# Patient Record
Sex: FEMALE | Race: AMERICAN INDIAN OR ALASKA NATIVE
[De-identification: names, ages, dates, MRNs, and addresses within clinical notes are randomized per-mention and may not be internally consistent; named-entity substitution may affect disease eponyms.]

---

## 2018-06-07 ENCOUNTER — HOSPITAL ENCOUNTER (EMERGENCY)
Dept: HOSPITAL 5 - ED | Age: 58
Discharge: HOME | End: 2018-06-07
Payer: COMMERCIAL

## 2018-06-07 VITALS — DIASTOLIC BLOOD PRESSURE: 88 MMHG | SYSTOLIC BLOOD PRESSURE: 172 MMHG

## 2018-06-07 DIAGNOSIS — Z88.8: ICD-10-CM

## 2018-06-07 DIAGNOSIS — I10: Primary | ICD-10-CM

## 2018-06-07 LAB
ALBUMIN SERPL-MCNC: 3.8 G/DL (ref 3.9–5)
ALT SERPL-CCNC: 10 UNITS/L (ref 7–56)
BILIRUB UR QL STRIP: (no result)
BLOOD UR QL VISUAL: (no result)
BUN SERPL-MCNC: 13 MG/DL (ref 7–17)
BUN/CREAT SERPL: 22 %
CALCIUM SERPL-MCNC: 9 MG/DL (ref 8.4–10.2)
HCT VFR BLD CALC: 40.6 % (ref 30.3–42.9)
HEMOLYSIS INDEX: 5
HGB BLD-MCNC: 13.4 GM/DL (ref 10.1–14.3)
MCH RBC QN AUTO: 26 PG (ref 28–32)
MCHC RBC AUTO-ENTMCNC: 33 % (ref 30–34)
MCV RBC AUTO: 79 FL (ref 79–97)
MUCOUS THREADS #/AREA URNS HPF: (no result) /HPF
PH UR STRIP: 7 [PH] (ref 5–7)
PLATELET # BLD: 284 K/MM3 (ref 140–440)
PROT UR STRIP-MCNC: (no result) MG/DL
RBC # BLD AUTO: 5.13 M/MM3 (ref 3.65–5.03)
RBC #/AREA URNS HPF: 2 /HPF (ref 0–6)
UROBILINOGEN UR-MCNC: 2 MG/DL (ref ?–2)
WBC #/AREA URNS HPF: < 1 /HPF (ref 0–6)

## 2018-06-07 PROCEDURE — 93005 ELECTROCARDIOGRAM TRACING: CPT

## 2018-06-07 PROCEDURE — 93010 ELECTROCARDIOGRAM REPORT: CPT

## 2018-06-07 PROCEDURE — 80053 COMPREHEN METABOLIC PANEL: CPT

## 2018-06-07 PROCEDURE — 36415 COLL VENOUS BLD VENIPUNCTURE: CPT

## 2018-06-07 PROCEDURE — 81001 URINALYSIS AUTO W/SCOPE: CPT

## 2018-06-07 PROCEDURE — 84703 CHORIONIC GONADOTROPIN ASSAY: CPT

## 2018-06-07 PROCEDURE — 85027 COMPLETE CBC AUTOMATED: CPT

## 2018-06-07 PROCEDURE — 70450 CT HEAD/BRAIN W/O DYE: CPT

## 2018-06-07 NOTE — CAT SCAN REPORT
CT HEAD WITHOUT CONTRAST:



HISTORY:  Headache, dizziness.



TECHNIQUE:  Sequential 2.5mm CT images.



COMPARISON: none.



FINDINGS:



Cerebral Parenchyma: Within normal limits.



Cerebellum:  Within normal limits.



Brainstem:  Within normal limits.



Ventricles: Normal.



Sella:  Normal.



Extra-axial spaces:  Normal.



Basal Cisterns:  Normal.



Intracranial Hemorrhage:  None.



Midline Shift:  None.



Calvarium: Normal.



Sinuses: Normal.



Mastoid Air Cells:  Normal.



Visualized Orbits:  Normal.







IMPRESSION:

Cranial CT scan within normal limits.

## 2018-06-07 NOTE — EMERGENCY DEPARTMENT REPORT
ED General Adult HPI





- General


Chief complaint: Headache


Stated complaint: HEADACHE


Time Seen by Provider: 06/07/18 09:44


Source: patient


Mode of arrival: Ambulatory


Limitations: No Limitations





- History of Present Illness


Initial comments: 





Is a 57-year-old female with no significant known past medical history who is 

complaining of generalized headache for the past week.  Patient states it waxes 

and wanes but at its worst is 10 out of 10 currently is 7 out of 10.  She 

states is pounding headache.  Patient denies any chest pain shortness of breath 

nausea vomiting diarrhea decreased vision and decreased ability to move her 

extremities.  Patient states she at times is somewhat dizzy and does have some 

blurred vision.





- Related Data


 Previous Rx's











 Medication  Instructions  Recorded  Last Taken  Type


 


Acetaminophen/Codeine [Tylenol #3] 1 tab PO Q6H PRN #10 tab 02/04/16 Unknown Rx


 


Benzonatate [Tessalon Perles] 100 mg PO Q8HR #15 capsule 02/04/16 Unknown Rx


 


guaiFENesin [Mucinex] 600 mg PO Q6HR #15 tablet.er 02/04/16 Unknown Rx


 


Amlodipine Besylate [Norvasc] 5 mg PO DAILY #30 tablet 06/07/18 Unknown Rx


 


Ibuprofen [Motrin] 600 mg PO Q8H PRN #15 tablet 06/07/18 Unknown Rx











 Allergies











Allergy/AdvReac Type Severity Reaction Status Date / Time


 


tramadol Allergy  Unknown Verified 06/07/18 07:39














ED Review of Systems


ROS: 


Stated complaint: HEADACHE


Other details as noted in HPI





Comment: All other systems reviewed and negative





ED Past Medical Hx





- Past Medical History


Previous Medical History?: No





- Surgical History


Past Surgical History?: No





- Social History


Smoking Status: Never Smoker


Substance Use Type: None





- Medications


Home Medications: 


 Home Medications











 Medication  Instructions  Recorded  Confirmed  Last Taken  Type


 


Acetaminophen/Codeine [Tylenol #3] 1 tab PO Q6H PRN #10 tab 02/04/16  Unknown Rx


 


Benzonatate [Tessalon Perles] 100 mg PO Q8HR #15 capsule 02/04/16  Unknown Rx


 


guaiFENesin [Mucinex] 600 mg PO Q6HR #15 tablet.er 02/04/16  Unknown Rx


 


Amlodipine Besylate [Norvasc] 5 mg PO DAILY #30 tablet 06/07/18  Unknown Rx


 


Ibuprofen [Motrin] 600 mg PO Q8H PRN #15 tablet 06/07/18  Unknown Rx














ED Physical Exam





- General


Limitations: No Limitations


General appearance: alert, in no apparent distress





- Head


Head exam: Present: atraumatic, normocephalic





- Eye


Eye exam: Present: normal appearance





- ENT


ENT exam: Present: mucous membranes moist





- Neck


Neck exam: Present: normal inspection





- Respiratory


Respiratory exam: Present: normal lung sounds bilaterally.  Absent: respiratory 

distress





- Cardiovascular


Cardiovascular Exam: Present: regular rate, normal rhythm.  Absent: systolic 

murmur, diastolic murmur, rubs, gallop





- GI/Abdominal


GI/Abdominal exam: Present: soft, normal bowel sounds





- Extremities Exam


Extremities exam: Present: normal inspection





- Back Exam


Back exam: Present: normal inspection





- Neurological Exam


Neurological exam: Present: alert, oriented X3





- Psychiatric


Psychiatric exam: Present: normal affect, normal mood





- Skin


Skin exam: Present: warm, dry, intact, normal color.  Absent: rash





ED Course





 Vital Signs











  06/07/18





  07:40


 


Temperature 98.8 F


 


Pulse Rate 78


 


Respiratory 18





Rate 


 


Blood Pressure 172/89


 


O2 Sat by Pulse 98





Oximetry 














ED Medical Decision Making





- Lab Data


Result diagrams: 


 06/07/18 07:59





 06/07/18 07:59





- EKG Data


-: EKG Interpreted by Me





- EKG Data


Interpretation: other (EKG shows sinus rhythm a rate of 74 normal axis 

intervals no ST segment elevation or depressions time of interpretation is 755)





- Medical Decision Making





Patient on review of her labs shows no acute abnormality there was elevated 

blood pressure which was treated here in the emergency department.  Patient 

will be referred to primary care was started on low-dose Norvasc and will be 

discharged home.


Critical care attestation.: 


If time is entered above; I have spent that time in minutes in the direct care 

of this critically ill patient, excluding procedure time.








ED Disposition


Clinical Impression: 


 Hypertensive urgency





Disposition: DC-01 TO HOME OR SELFCARE


Is pt being admited?: No


Does the pt Need Aspirin: No


Condition: Stable


Instructions:  Hypertension (ED)


Prescriptions: 


Amlodipine Besylate [Norvasc] 5 mg PO DAILY #30 tablet


Ibuprofen [Motrin] 600 mg PO Q8H PRN #15 tablet


 PRN Reason: Pain


Referrals: 


DANIEL BARRERA MD [Staff Physician] - 3-5 Days

## 2020-11-04 ENCOUNTER — HOSPITAL ENCOUNTER (EMERGENCY)
Dept: HOSPITAL 5 - ED | Age: 60
Discharge: HOME | End: 2020-11-04
Payer: SELF-PAY

## 2020-11-04 VITALS — SYSTOLIC BLOOD PRESSURE: 109 MMHG | DIASTOLIC BLOOD PRESSURE: 68 MMHG

## 2020-11-04 DIAGNOSIS — R07.9: ICD-10-CM

## 2020-11-04 DIAGNOSIS — Z88.8: ICD-10-CM

## 2020-11-04 DIAGNOSIS — Z79.899: ICD-10-CM

## 2020-11-04 DIAGNOSIS — I10: Primary | ICD-10-CM

## 2020-11-04 LAB
ALBUMIN SERPL-MCNC: 4 G/DL (ref 3.9–5)
ALT SERPL-CCNC: 14 UNITS/L (ref 7–56)
BASOPHILS # (AUTO): 0.1 K/MM3 (ref 0–0.1)
BASOPHILS NFR BLD AUTO: 0.6 % (ref 0–1.8)
BUN SERPL-MCNC: 12 MG/DL (ref 7–17)
BUN/CREAT SERPL: 24 %
CALCIUM SERPL-MCNC: 9.1 MG/DL (ref 8.4–10.2)
EOSINOPHIL # BLD AUTO: 0.4 K/MM3 (ref 0–0.4)
EOSINOPHIL NFR BLD AUTO: 3.4 % (ref 0–4.3)
HCT VFR BLD CALC: 41.3 % (ref 30.3–42.9)
HEMOLYSIS INDEX: 5
HGB BLD-MCNC: 13.5 GM/DL (ref 10.1–14.3)
LYMPHOCYTES # BLD AUTO: 2.8 K/MM3 (ref 1.2–5.4)
LYMPHOCYTES NFR BLD AUTO: 26.3 % (ref 13.4–35)
MCHC RBC AUTO-ENTMCNC: 33 % (ref 30–34)
MCV RBC AUTO: 81 FL (ref 79–97)
MONOCYTES # (AUTO): 0.7 K/MM3 (ref 0–0.8)
MONOCYTES % (AUTO): 6.8 % (ref 0–7.3)
PLATELET # BLD: 297 K/MM3 (ref 140–440)
RBC # BLD AUTO: 5.09 M/MM3 (ref 3.65–5.03)

## 2020-11-04 PROCEDURE — 71046 X-RAY EXAM CHEST 2 VIEWS: CPT

## 2020-11-04 PROCEDURE — 85025 COMPLETE CBC W/AUTO DIFF WBC: CPT

## 2020-11-04 PROCEDURE — 93005 ELECTROCARDIOGRAM TRACING: CPT

## 2020-11-04 PROCEDURE — 80053 COMPREHEN METABOLIC PANEL: CPT

## 2020-11-04 PROCEDURE — 84484 ASSAY OF TROPONIN QUANT: CPT

## 2020-11-04 PROCEDURE — 36415 COLL VENOUS BLD VENIPUNCTURE: CPT

## 2020-11-04 NOTE — EVENT NOTE
ED Screening Note


ED Screening Note: 





left sided CP that began at 4 AM this morning


states intermittent sharp pain


radiates down left arm 


no n/v/d


+headache


no cough 


no leg swelling 


no sob 


pmhx none 


allergy: tramadol


occ ETOH


non smoker











This initial assessment/diagnostic orders/clinical plan/treatment(s) is/are 

subject to change based on patients health status, clinical progression and re-

assessment by fellow clinical providers in the ED. Further treatment and workup 

at subsequent clinical providers discretion. Patient/guardian urged not to elope

from the ED as their condition may be serious if not clinically assessed and 

managed. 





Initial orders include: 


CP protocol

## 2020-11-04 NOTE — EMERGENCY DEPARTMENT REPORT
ED Chest Pain HPI





- General


Chief Complaint: Chest Pain


Stated Complaint: CHEST PAIN, HEADACHE


Time Seen by Provider: 11/04/20 13:34


Source: patient


Mode of arrival: Ambulatory


Limitations: No Limitations





- History of Present Illness


Initial Comments: 





Patient is 60 years old female with history of hypertension, noncompliant with 

her medication.  Patient presented to the ER complaining of chest pain, left-

sided with radiation to the left upper extremity.  Patient also complaining of 

headache on and off.  Patient denied any focal weakness, numbness or tingling 

sensation.  No shortness of breath.  Patient stated that she does not remember 

the last time she took her blood pressure medicine.  Patient blood pressure in 

triage is 202/98.


MD Complaint: chest pain


-: days(s) (2)


Onset: during rest


Pain Location: left chest


Pain Radiation: LUE


Severity scale (0 -10): 7


Quality: heaviness


Consistency: intermittent





- Related Data


                                  Previous Rx's











 Medication  Instructions  Recorded  Last Taken  Type


 


Acetaminophen/Codeine [Tylenol #3] 1 tab PO Q6H PRN #10 tab 02/04/16 Unknown Rx


 


Benzonatate [Tessalon Perles] 100 mg PO Q8HR #15 capsule 02/04/16 Unknown Rx


 


guaiFENesin [Mucinex] 600 mg PO Q6HR #15 tablet.er 02/04/16 Unknown Rx


 


Amlodipine Besylate [Norvasc] 5 mg PO DAILY #30 tablet 06/07/18 Unknown Rx


 


Ibuprofen [Motrin] 600 mg PO Q8H PRN #15 tablet 06/07/18 Unknown Rx











                                    Allergies











Allergy/AdvReac Type Severity Reaction Status Date / Time


 


tramadol Allergy  Unknown Verified 06/07/18 07:39














Heart Score





- HEART Score


History: Moderately suspicious


EKG: Non-specific


Age: 45-65


Risk factors: 1-2 risk factors


Troponin: < normal limit


HEART Score: 4





- Critical Actions


Critical Actions: 4-6 pts:12-16.6% risk of adverse cardiac event. Should be 

admitted





ED Review of Systems


ROS: 


Stated complaint: CHEST PAIN, HEADACHE


Other details as noted in HPI





Comment: All other systems reviewed and negative


Constitutional: denies: chills, fever


Respiratory: denies: cough, shortness of breath, SOB with exertion


Cardiovascular: chest pain.  denies: palpitations


Gastrointestinal: denies: abdominal pain, nausea, vomiting


Musculoskeletal: denies: back pain


Neurological: headache.  denies: weakness, numbness, paresthesias, confusion, 

abnormal gait





ED Past Medical Hx





- Past Medical History


Previous Medical History?: No





- Surgical History


Past Surgical History?: No





- Social History


Smoking Status: Never Smoker


Substance Use Type: None





- Medications


Home Medications: 


                                Home Medications











 Medication  Instructions  Recorded  Confirmed  Last Taken  Type


 


Acetaminophen/Codeine [Tylenol #3] 1 tab PO Q6H PRN #10 tab 02/04/16  Unknown Rx


 


Benzonatate [Tessalon Perles] 100 mg PO Q8HR #15 capsule 02/04/16  Unknown Rx


 


guaiFENesin [Mucinex] 600 mg PO Q6HR #15 tablet.er 02/04/16  Unknown Rx


 


Amlodipine Besylate [Norvasc] 5 mg PO DAILY #30 tablet 06/07/18  Unknown Rx


 


Ibuprofen [Motrin] 600 mg PO Q8H PRN #15 tablet 06/07/18  Unknown Rx














ED Physical Exam





- General


Limitations: No Limitations


General appearance: alert, in no apparent distress





- Head


Head exam: Present: atraumatic, normocephalic, normal inspection





- Eye


Eye exam: Present: normal appearance





- ENT


ENT exam: Present: normal exam, normal orophraynx, mucous membranes moist





- Neck


Neck exam: Present: normal inspection, full ROM.  Absent: tenderness, 

meningismus, lymphadenopathy, thyromegaly





- Respiratory


Respiratory exam: Present: normal lung sounds bilaterally





- Cardiovascular


Cardiovascular Exam: Present: regular rate, normal rhythm, normal heart sounds





- GI/Abdominal


GI/Abdominal exam: Present: soft, normal bowel sounds.  Absent: distended, 

tenderness, guarding, rebound, rigid, organomegaly, mass, bruit, pulsatile mass





- Extremities Exam


Extremities exam: Present: normal inspection, full ROM, normal capillary refill.

  Absent: pedal edema, calf tenderness





- Back Exam


Back exam: Present: normal inspection, full ROM.  Absent: CVA tenderness (R), 

CVA tenderness (L)





- Neurological Exam


Neurological exam: Present: alert, oriented X3, CN II-XII intact, normal gait, 

reflexes normal.  Absent: motor sensory deficit





- Psychiatric


Psychiatric exam: Present: normal mood





- Skin


Skin exam: Present: warm, intact, normal color





ED Course


                                   Vital Signs











  11/04/20 11/04/20 11/04/20





  12:29 13:36 19:22


 


Temperature 98.1 F  


 


Pulse Rate 91 H 85 47 L


 


Respiratory 14 16 12





Rate   


 


Blood Pressure 202/98  


 


Blood Pressure  186/85 





[Right]   


 


O2 Sat by Pulse 98 100 97





Oximetry   














  11/04/20 11/04/20 11/04/20





  19:31 19:38 19:45


 


Temperature   


 


Pulse Rate 71 74 65


 


Respiratory 20  21





Rate   


 


Blood Pressure 171/85 171/85 171/85


 


Blood Pressure   





[Right]   


 


O2 Sat by Pulse 97  97





Oximetry   














  11/04/20 11/04/20 11/04/20





  20:00 20:15 20:30


 


Temperature   


 


Pulse Rate 66 61 60


 


Respiratory 24 19 23





Rate   


 


Blood Pressure 147/77 147/77 113/61


 


Blood Pressure   





[Right]   


 


O2 Sat by Pulse 96 100 81 L





Oximetry   














  11/04/20 11/04/20 11/04/20





  20:45 21:00 21:15


 


Temperature   


 


Pulse Rate 59 L 59 L 65


 


Respiratory 20 21 18





Rate   


 


Blood Pressure 113/61 109/58 109/58


 


Blood Pressure   





[Right]   


 


O2 Sat by Pulse 100 88 99





Oximetry   














  11/04/20 11/04/20 11/04/20





  21:30 21:45 22:00


 


Temperature   


 


Pulse Rate 59 L 56 L 67


 


Respiratory 19 16 24





Rate   


 


Blood Pressure 108/57 108/57 109/68


 


Blood Pressure   





[Right]   


 


O2 Sat by Pulse 98 100 96





Oximetry   














ED Medical Decision Making





- Lab Data


Result diagrams: 


                                 11/04/20 13:40





                                 11/04/20 13:40





- EKG Data


-: EKG Interpreted by Me


EKG shows normal: sinus rhythm


Rate: normal





- EKG Data


Interpretation: no acute changes





- Radiology Data


Radiology results: report reviewed





- Medical Decision Making


Patient is 60 years old female with history of hypertension, noncompliant with 

her medication.  Patient presented to the ER complaining of chest pain, left-si

ded with radiation to the left upper extremity.  Patient also complaining of 

headache on and off.  Patient denied any focal weakness, numbness or tingling 

sensation.  No shortness of breath.  Patient stated that she does not remember 

the last time she took her blood pressure medicine.  Patient blood pressure in 

triage is 202/98.





EKG is unremarkable.  Chest x-ray is negative for acute finding.  Labs reviewed 

and is negative including troponin x2.  Patient received clonidine 0.2 mg and 

with the reduction of blood pressure patient symptoms completely resolved.  

Patient currently denying any chest pain or headache.  Patient given 

prescription for amlodipine 5 mg and counseled about blood pressure medication 

compliance.  Patient also advised to return to the ER if she develop any new 

symptoms.  Patient advised to follow-up with her primary care physician for 

outpatient cardiac work-up.





Critical care attestation.: 


If time is entered above; I have spent that time in minutes in the direct care 

of this critically ill patient, excluding procedure time.








ED Disposition


Clinical Impression: 


 Chest pain, Malignant hypertension





Disposition: DC-01 TO HOME OR SELFCARE


Is pt being admited?: No


Condition: Stable


Instructions:  Chest Pain (ED), Hypertension (ED), Hypertension During 

Pregnancy, Easy-to-Read, Nonspecific Chest Pain, Adult


Referrals: 


PRIMARY CARE,MD [Primary Care Provider] - 3-5 Days

## 2020-11-04 NOTE — XRAY REPORT
CHEST 2 VIEWS 



INDICATION / CLINICAL INFORMATION:

Chest Pain.



COMPARISON: 

None available.



FINDINGS:



SUPPORT DEVICES: None.

HEART / MEDIASTINUM: No significant abnormality. 

LUNGS / PLEURA: No significant pulmonary or pleural abnormality. No pneumothorax. 



ADDITIONAL FINDINGS: No significant additional findings.



IMPRESSION:

No significant abnormality



Signer Name: Foster Michael MD FACR 

Signed: 11/4/2020 2:30 PM

Workstation Name: [id number]

## 2021-09-26 ENCOUNTER — HOSPITAL ENCOUNTER (EMERGENCY)
Dept: HOSPITAL 5 - ED | Age: 61
Discharge: HOME | End: 2021-09-26
Payer: COMMERCIAL

## 2021-09-26 VITALS — DIASTOLIC BLOOD PRESSURE: 89 MMHG | SYSTOLIC BLOOD PRESSURE: 169 MMHG

## 2021-09-26 DIAGNOSIS — M54.6: Primary | ICD-10-CM

## 2021-09-26 DIAGNOSIS — Z88.5: ICD-10-CM

## 2021-09-26 DIAGNOSIS — F10.20: ICD-10-CM

## 2021-09-26 PROCEDURE — 99282 EMERGENCY DEPT VISIT SF MDM: CPT

## 2021-09-26 PROCEDURE — 96372 THER/PROPH/DIAG INJ SC/IM: CPT

## 2021-09-26 NOTE — EMERGENCY DEPARTMENT REPORT
ED Neck Pain/Injury HPI





- General


Chief Complaint: Neck Pain/Injury


Stated Complaint: PULLED MUSCLE IN NECK


Time Seen by Provider: 21 08:15


Source: patient, RN notes reviewed


Mode of arrival: Ambulatory


Limitations: No Limitations





- History of Present Illness


Initial Comments: 





This is a 61-year-old female nontoxic, well nourished in appearance, no acute 

signs of distress presents to the ED with c/o of right upper back pains that 

started this morning.  Patient denies any radiation of pain.  Stated wake up 

with this pain.  Patient denies any trauma.  Denies any bladder or bowel 

instability.  Patient denies any urinary symptoms.  Pain is worse with movement 

of neck and improved with rest.  Denies any fever, chills, nausea, vomiting, 

headache, stiff neck, chest pain or shortness of breath.  Patient denies any 

numbness or tingling.  Stated allergies to Tramadol.  Denies any allergies to 

[place].


MD Complaint: upper back pain


-: This morning


Place: home


Severity: mild


Severity scale (0 -10): 3


Quality: aching


Consistency: intermittent


Improves With: immobilization


Worsens With: movement of neck


Associated Symptoms: none.  denies: headache, fever, numbness, tingling, 

weakness, vertigo, difficulty walking, swollen glands, difficulty swallowing, 

nausea, vomiting





- Related Data


                                  Previous Rx's











 Medication  Instructions  Recorded  Last Taken  Type


 


Acetaminophen/Codeine [Tylenol #3] 1 tab PO Q6H PRN #10 tab 16 Unknown Rx


 


Benzonatate [Tessalon Perles] 100 mg PO Q8HR #15 capsule 16 Unknown Rx


 


guaiFENesin [Mucinex] 600 mg PO Q6HR #15 tablet.er 16 Unknown Rx


 


Amlodipine Besylate [Norvasc] 5 mg PO DAILY #30 tablet 18 Unknown Rx


 


Ibuprofen [Motrin] 600 mg PO Q8H PRN #15 tablet 18 Unknown Rx


 


amLODIPine [Norvasc] 5 mg PO DAILY #30 tab 20 Unknown Rx


 


Cyclobenzaprine [Flexeril] 10 mg PO QHS PRN #10 tablet 21 Unknown Rx


 


Naproxen 500 mg PO Q12H PRN #12 tablet 21 Unknown Rx











                                    Allergies











Allergy/AdvReac Type Severity Reaction Status Date / Time


 


tramadol Allergy  Unknown Verified 18 07:39














ED Review of Systems


ROS: 


Stated complaint: PULLED MUSCLE IN NECK


Other details as noted in HPI





Comment: All other systems reviewed and negative


Constitutional: denies: chills, fever


Eyes: denies: eye pain, eye discharge, vision change


ENT: denies: ear pain, throat pain


Respiratory: denies: cough, shortness of breath, wheezing


Cardiovascular: denies: chest pain, palpitations


Endocrine: no symptoms reported


Gastrointestinal: denies: abdominal pain, nausea, diarrhea


Genitourinary: denies: urgency, dysuria, discharge


Musculoskeletal: denies: back pain, joint swelling, arthralgia


Skin: denies: rash, lesions


Neurological: denies: headache, weakness, paresthesias


Psychiatric: denies: anxiety, depression


Hematological/Lymphatic: denies: easy bleeding, easy bruising





ED Past Medical Hx





- Past Medical History


Previous Medical History?: Yes


Additional medical history: childbirth





- Surgical History


Past Surgical History?: Yes


Additional Surgical History:  x 2





- Social History


Smoking Status: Never Smoker


Substance Use Type: Alcohol





- Medications


Home Medications: 


                                Home Medications











 Medication  Instructions  Recorded  Confirmed  Last Taken  Type


 


Acetaminophen/Codeine [Tylenol #3] 1 tab PO Q6H PRN #10 tab 16  Unknown Rx


 


Benzonatate [Tessalon Perles] 100 mg PO Q8HR #15 capsule 16  Unknown Rx


 


guaiFENesin [Mucinex] 600 mg PO Q6HR #15 tablet.er 16  Unknown Rx


 


Amlodipine Besylate [Norvasc] 5 mg PO DAILY #30 tablet 18  Unknown Rx


 


Ibuprofen [Motrin] 600 mg PO Q8H PRN #15 tablet 18  Unknown Rx


 


amLODIPine [Norvasc] 5 mg PO DAILY #30 tab 20  Unknown Rx


 


Cyclobenzaprine [Flexeril] 10 mg PO QHS PRN #10 tablet 21  Unknown Rx


 


Naproxen 500 mg PO Q12H PRN #12 tablet 21  Unknown Rx














ED Physical Exam





- General


Limitations: No Limitations


General appearance: alert, in no apparent distress





- Head


Head exam: Present: atraumatic, normocephalic





- Eye


Eye exam: Present: normal appearance





- ENT


ENT exam: Present: normal exam, normal orophraynx





- Neck


Neck exam: Present: normal inspection, full ROM.  Absent: tenderness, 

meningismus, lymphadenopathy





- Respiratory


Respiratory exam: Present: normal lung sounds bilaterally.  Absent: respiratory 

distress, wheezes, rales, rhonchi, stridor, chest wall tenderness, accessory 

muscle use, decreased breath sounds, prolonged expiratory





- Cardiovascular


Cardiovascular Exam: Present: regular rate, normal rhythm, normal heart sounds. 

 Absent: bradycardia, tachycardia, irregular rhythm, systolic murmur, diastolic 

murmur, rubs, gallop





- Extremities Exam


Extremities exam: Present: normal inspection, full ROM, normal capillary refill.

  Absent: tenderness





- Back Exam


Back exam: Present: normal inspection, full ROM, paraspinal tenderness (right 

cervical paraspinal).  Absent: tenderness, CVA tenderness (R), CVA tenderness 

(L), muscle spasm, vertebral tenderness, rash noted





- Neurological Exam


Neurological exam: Present: alert, oriented X3, normal gait





- Psychiatric


Psychiatric exam: Present: normal affect, normal mood





- Skin


Skin exam: Present: warm, dry, intact, normal color.  Absent: rash





ED Course


                                   Vital Signs











  21





  08:14 09:13


 


Temperature 98.1 F 


 


Pulse Rate 75 


 


Respiratory 20 20





Rate  


 


Blood Pressure 168/98 


 


O2 Sat by Pulse 99 





Oximetry  














- Reevaluation(s)


Reevaluation #1: 





21 08:51


Patient is speaking in full sentences with no signs of distress noted.





ED Medical Decision Making





- Medical Decision Making





This is a 61-year-old female that presents with upper back strain.  Patient is 

stable was examined by me.  There is no spinal tenderness.  There is no cauda 

equina syndrome during examination.  No bladder or bowel instability. Patient 

received Toradol 60 mg IM, prednisone and Norco in the ED which stated that her 

symptoms has resolved and subsided.  Stated family member will drive patient 

home after discharge due to possible drowsiness of [place].  Patient is discharged

 with muscle relaxant and naproxen.  Patient was instructed not to operate any 

machinery while taking muscle relaxant as they cause her drowsiness.  Patient 

was referred to Follow-up with a primary care doctor in 3-5 days or if symptoms 

worsen and continue return to emergency room as soon as possible.  At time of 

discharge, the patient does not seem toxic or ill in appearance.  No acute signs

 of distress noted.  Patient agrees to discharge treatment plan of care.  No 

further questions noted by the patient.





This chart is dictated with using Dragon Dictation Program


Critical care attestation.: 


If time is entered above; I have spent that time in minutes in the direct care 

of this critically ill patient, excluding procedure time.








ED Disposition


Clinical Impression: 


 Upper back pain on right side





Disposition:  HOME / SELF CARE / HOMELESS


Is pt being admited?: No


Does the pt Need Aspirin: No


Condition: Stable


Instructions:  Acute Back Pain, Adult


Additional Instructions: 


Follow-up with your primary care doctor in 3-5 days or if symptoms worsen such 

as bladder or bowel stability, chest pain, short of breath, numbness or tingling

 sensation in extremities, headache, dizziness, visual changes, nausea vomiting,

 or abdominal pain, return back to emergency room as was possible.





Take naproxen and Flexeril as prescribed.  Do not operate heavy machinery while 

taking Flexeril due to sedation


Prescriptions: 


Cyclobenzaprine [Flexeril] 10 mg PO QHS PRN #10 tablet


 PRN Reason: Muscle Spasm


Naproxen 500 mg PO Q12H PRN #12 tablet


 PRN Reason: Pain , Severe (7-10)


Referrals: 


PRIMARY CARE,MD [Referring] - 3-5 Days


YUE GUARDADO MD [Staff Physician] - 3-5 Days


Forms:  Work/School Release Form(ED)


Time of Disposition: 09:48

## 2021-10-01 ENCOUNTER — DASHBOARD ENCOUNTERS (OUTPATIENT)
Age: 61
End: 2021-10-01

## 2021-10-01 ENCOUNTER — OFFICE VISIT (OUTPATIENT)
Dept: URBAN - METROPOLITAN AREA CLINIC 109 | Facility: CLINIC | Age: 61
End: 2021-10-01
Payer: COMMERCIAL

## 2021-10-01 ENCOUNTER — WEB ENCOUNTER (OUTPATIENT)
Dept: URBAN - METROPOLITAN AREA CLINIC 109 | Facility: CLINIC | Age: 61
End: 2021-10-01

## 2021-10-01 DIAGNOSIS — Z12.11 SCREEN FOR COLON CANCER: ICD-10-CM

## 2021-10-01 PROCEDURE — 99202 OFFICE O/P NEW SF 15 MIN: CPT | Performed by: INTERNAL MEDICINE

## 2021-10-01 RX ORDER — TRIAMTERENE AND HYDROCHLOROTHIAZIDE 37.5; 25 MG/1; MG/1
TABLET ORAL
Qty: 90 | Status: ACTIVE | COMMUNITY

## 2021-10-04 PROBLEM — 305058001: Status: ACTIVE | Noted: 2021-10-04

## 2021-10-28 ENCOUNTER — OFFICE VISIT (OUTPATIENT)
Dept: URBAN - METROPOLITAN AREA SURGERY CENTER 23 | Facility: SURGERY CENTER | Age: 61
End: 2021-10-28
Payer: COMMERCIAL

## 2021-10-28 DIAGNOSIS — K63.89 BACTERIAL OVERGROWTH SYNDROME: ICD-10-CM

## 2021-10-28 DIAGNOSIS — Z12.11 AVERAGE RISK FOR CRC. DUE TO PT'S CO-MORBID STATE WITH END STAGE DEMENTIA, HIGH RISK FOR ANESTHESIA (PER NEUROLOGY); INABILITY TO TAKE A BOWEL PREP....WOULD NOT ADVISE ANY COLORECTAL CANCER SCREENING INCLUDING STOOL TEST FOR FECAL BLOOD.: ICD-10-CM

## 2021-10-28 DIAGNOSIS — D12.5 ADENOMA OF SIGMOID COLON: ICD-10-CM

## 2021-10-28 PROCEDURE — G8907 PT DOC NO EVENTS ON DISCHARG: HCPCS | Performed by: INTERNAL MEDICINE

## 2021-10-28 PROCEDURE — 45380 COLONOSCOPY AND BIOPSY: CPT | Performed by: INTERNAL MEDICINE

## 2021-10-28 PROCEDURE — 45385 COLONOSCOPY W/LESION REMOVAL: CPT | Performed by: INTERNAL MEDICINE

## 2021-10-28 RX ORDER — TRIAMTERENE AND HYDROCHLOROTHIAZIDE 37.5; 25 MG/1; MG/1
TABLET ORAL
Qty: 90 | Status: ACTIVE | COMMUNITY

## 2022-04-06 ENCOUNTER — HOSPITAL ENCOUNTER (EMERGENCY)
Dept: HOSPITAL 5 - ED | Age: 62
Discharge: HOME | End: 2022-04-06
Payer: SELF-PAY

## 2022-04-06 VITALS — SYSTOLIC BLOOD PRESSURE: 138 MMHG | DIASTOLIC BLOOD PRESSURE: 74 MMHG

## 2022-04-06 DIAGNOSIS — Z88.5: ICD-10-CM

## 2022-04-06 DIAGNOSIS — J06.9: Primary | ICD-10-CM

## 2022-04-06 LAB
ALBUMIN SERPL-MCNC: 4 G/DL (ref 3.9–5)
ALT SERPL-CCNC: 13 UNITS/L (ref 7–56)
BASOPHILS # (AUTO): 0.2 K/MM3 (ref 0–0.1)
BASOPHILS NFR BLD AUTO: 1.3 % (ref 0–1.8)
BUN SERPL-MCNC: 15 MG/DL (ref 7–17)
BUN/CREAT SERPL: 19 %
CALCIUM SERPL-MCNC: 9.2 MG/DL (ref 8.4–10.2)
EOSINOPHIL # BLD AUTO: 0.4 K/MM3 (ref 0–0.4)
EOSINOPHIL NFR BLD AUTO: 3.1 % (ref 0–4.3)
HCT VFR BLD CALC: 40.4 % (ref 30.3–42.9)
HEMOLYSIS INDEX: 5
HGB BLD-MCNC: 12.9 GM/DL (ref 10.1–14.3)
LYMPHOCYTES # BLD AUTO: 2 K/MM3 (ref 1.2–5.4)
LYMPHOCYTES NFR BLD AUTO: 15 % (ref 13.4–35)
MCHC RBC AUTO-ENTMCNC: 32 % (ref 30–34)
MCV RBC AUTO: 81 FL (ref 79–97)
MONOCYTES # (AUTO): 1 K/MM3 (ref 0–0.8)
MONOCYTES % (AUTO): 7.5 % (ref 0–7.3)
PLATELET # BLD: 332 K/MM3 (ref 140–440)
RBC # BLD AUTO: 5 M/MM3 (ref 3.65–5.03)

## 2022-04-06 PROCEDURE — 80053 COMPREHEN METABOLIC PANEL: CPT

## 2022-04-06 PROCEDURE — 93005 ELECTROCARDIOGRAM TRACING: CPT

## 2022-04-06 PROCEDURE — 96365 THER/PROPH/DIAG IV INF INIT: CPT

## 2022-04-06 PROCEDURE — 99284 EMERGENCY DEPT VISIT MOD MDM: CPT

## 2022-04-06 PROCEDURE — 84484 ASSAY OF TROPONIN QUANT: CPT

## 2022-04-06 PROCEDURE — 85025 COMPLETE CBC W/AUTO DIFF WBC: CPT

## 2022-04-06 PROCEDURE — 36415 COLL VENOUS BLD VENIPUNCTURE: CPT

## 2022-04-06 PROCEDURE — 71046 X-RAY EXAM CHEST 2 VIEWS: CPT

## 2022-04-06 RX ADMIN — CEFTRIAXONE SODIUM ONE MLS/HR: 1 INJECTION, POWDER, FOR SOLUTION INTRAMUSCULAR; INTRAVENOUS at 12:03

## 2022-04-06 NOTE — EMERGENCY DEPARTMENT REPORT
ED General Adult HPI





- General


Chief complaint: Chest Pain


Stated complaint: UPPER CHEST/COUGH


PUI?: Yes


Time Seen by Provider: 22 09:54


Source: patient


Mode of arrival: Ambulatory


Limitations: No Limitations





- History of Present Illness


Initial comments: 





Patient is a 61-year-old female that comes to the emergency room with a cough.  

She has chest pain only with the coughing.  She denies any shortness of breath. 

She is not immunized for Covid.  She has not seen her primary care doctor.  She 

has been taking over-the-counter  meds without relief so she comes to the 

emergency room


-: Gradual, days(s)


Location: chest


Severity scale (0 -10): 0


Consistency: intermittent


Improves with: none


Worsens with: none


Associated Symptoms: denies other symptoms, cough


Treatments Prior to Arrival: none





- Related Data


                                  Previous Rx's











 Medication  Instructions  Recorded  Last Taken  Type


 


amLODIPine [Norvasc] 5 mg PO DAILY #30 tab 20 Unknown Rx


 


Azithromycin [Zithromax Z-IRENA] 250 mg PO DAILY #6 tablet 22 Unknown Rx


 


Benzonatate [Tessalon Perles] 100 mg PO Q12H PRN #20 capsule 22 Unknown Rx


 


Fluticasone [Flonase] 1 spray NS QDAY #1 bottle 22 Unknown Rx


 


methylPREDNISolone [Medrol 4MG 4 mg PO FS #1 tab.ds.pk 22 Unknown Rx





DOSEPAK (21 tabs)]    











                                    Allergies











Allergy/AdvReac Type Severity Reaction Status Date / Time


 


tramadol Allergy  Unknown Verified 18 07:39














ED Review of Systems


ROS: 


Stated complaint: UPPER CHEST/COUGH


Other details as noted in HPI





Comment: All other systems reviewed and negative





ED Past Medical Hx





- Past Medical History


Previous Medical History?: Yes


Additional medical history: childbirth





- Surgical History


Past Surgical History?: Yes


Additional Surgical History:  x 2





- Family History


Family history: no significant





- Social History


Smoking Status: Never Smoker


Substance Use Type: None





- Medications


Home Medications: 


                                Home Medications











 Medication  Instructions  Recorded  Confirmed  Last Taken  Type


 


amLODIPine [Norvasc] 5 mg PO DAILY #30 tab 20  Unknown Rx


 


Azithromycin [Zithromax Z-IRENA] 250 mg PO DAILY #6 tablet 22  Unknown Rx


 


Benzonatate [Tessalon Perles] 100 mg PO Q12H PRN #20 capsule 22  Unknown 

Rx


 


Fluticasone [Flonase] 1 spray NS QDAY #1 bottle 22  Unknown Rx


 


methylPREDNISolone [Medrol 4MG 4 mg PO FS #1 tab.ds.pk 22  Unknown Rx





DOSEPAK (21 tabs)]     














ED Physical Exam





- General


Limitations: No Limitations


General appearance: alert, in no apparent distress





- Head


Head exam: Present: atraumatic, normocephalic





- Eye


Eye exam: Present: normal appearance





- ENT


ENT exam: Present: mucous membranes moist





- Neck


Neck exam: Present: normal inspection





- Respiratory


Respiratory exam: Present: normal lung sounds bilaterally.  Absent: respiratory 

distress





- Cardiovascular


Cardiovascular Exam: Present: regular rate, normal rhythm.  Absent: systolic 

murmur, diastolic murmur, rubs, gallop





- GI/Abdominal


GI/Abdominal exam: Present: soft, normal bowel sounds





- Extremities Exam


Extremities exam: Present: normal inspection





- Back Exam


Back exam: Present: normal inspection





- Neurological Exam


Neurological exam: Present: alert, oriented X3





- Psychiatric


Psychiatric exam: Present: normal affect, normal mood





- Skin


Skin exam: Present: warm, dry, intact, normal color.  Absent: rash





ED Course


                                   Vital Signs











  22





  09:52 10:16 11:54


 


Temperature 98.4 F  


 


Pulse Rate 86  


 


Respiratory 16 18 





Rate   


 


Blood Pressure   


 


Blood Pressure 165/83  





[Left]   


 


O2 Sat by Pulse 98 98 99





Oximetry   














  22





  12:01 13:06


 


Temperature  


 


Pulse Rate  78


 


Respiratory  16





Rate  


 


Blood Pressure 147/64 


 


Blood Pressure  138/74





[Left]  


 


O2 Sat by Pulse 98 99





Oximetry  














ED Medical Decision Making





- Lab Data


Result diagrams: 


                                 22 10:02





                                 22 10:02





- EKG Data


EKG shows normal: sinus rhythm


Rate: normal





- EKG Data


When compared to previous EKG there are: no significant change


Interpretation: no acute changes





- Radiology Data


Radiology results: report reviewed, image reviewed


interpreted by me: 





Concern for atelectasis/bilateral lower lobe infiltrates





No acute process





- Medical Decision Making








                                   Lab Results











  22 Range/Units





  10:02 10:02 


 


WBC  13.5 H   (4.5-11.0)  K/mm3


 


RBC  5.00   (3.65-5.03)  M/mm3


 


Hgb  12.9   (10.1-14.3)  gm/dl


 


Hct  40.4   (30.3-42.9)  %


 


MCV  81   (79-97)  fl


 


MCH  26 L   (28-32)  pg


 


MCHC  32   (30-34)  %


 


RDW  13.8   (13.2-15.2)  %


 


Plt Count  332   (140-440)  K/mm3


 


Lymph % (Auto)  15.0   (13.4-35.0)  %


 


Mono % (Auto)  7.5 H   (0.0-7.3)  %


 


Eos % (Auto)  3.1   (0.0-4.3)  %


 


Baso % (Auto)  1.3   (0.0-1.8)  %


 


Lymph # (Auto)  2.0   (1.2-5.4)  K/mm3


 


Mono # (Auto)  1.0 H   (0.0-0.8)  K/mm3


 


Eos # (Auto)  0.4   (0.0-0.4)  K/mm3


 


Baso # (Auto)  0.2 H   (0.0-0.1)  K/mm3


 


Seg Neutrophils %  73.1 H   (40.0-70.0)  %


 


Seg Neutrophils #  9.9 H   (1.8-7.7)  K/mm3


 


Sodium   139  (137-145)  mmol/L


 


Potassium   3.7  (3.6-5.0)  mmol/L


 


Chloride   101.6  ()  mmol/L


 


Carbon Dioxide   27  (22-30)  mmol/L


 


Anion Gap   14  mmol/L


 


BUN   15  (7-17)  mg/dL


 


Creatinine   0.8  (0.6-1.2)  mg/dL


 


Estimated GFR   > 60  ml/min


 


BUN/Creatinine Ratio   19  %


 


Glucose   112 H  ()  mg/dL


 


Calcium   9.2  (8.4-10.2)  mg/dL


 


Total Bilirubin   0.30  (0.1-1.2)  mg/dL


 


AST   10  (5-40)  units/L


 


ALT   13  (7-56)  units/L


 


Alkaline Phosphatase   127  ()  units/L


 


Troponin T   < 0.010  (0.00-0.029)  ng/mL


 


Total Protein   7.1  (6.3-8.2)  g/dL


 


Albumin   4.0  (3.9-5)  g/dL


 


Albumin/Globulin Ratio   1.3  %











                                   Vital Signs











  22





  09:52 10:16 11:54


 


Temperature 98.4 F  


 


Pulse Rate 86  


 


Respiratory 16 18 





Rate   


 


Blood Pressure   


 


Blood Pressure 165/83  





[Left]   


 


O2 Sat by Pulse 98 98 99





Oximetry   














  22





  12:01 13:06


 


Temperature  


 


Pulse Rate  78


 


Respiratory  16





Rate  


 


Blood Pressure 147/64 


 


Blood Pressure  138/74





[Left]  


 


O2 Sat by Pulse 98 99





Oximetry  








Labs noted.  Leukocytosis noted.





X-ray noted





INT with 1 g of IV Rocephin given





On discharge patient has been updated about plan of care including medications 

and x-ray findings.  She verbalizes understanding of diet, activity, medications

 and follow-up.  Patient is ambulatory, nonill nontoxic and non-ill-appearing on

 discharge.  She is taking p.o. without difficulty.  Patient ambulating without 

shortness of breath or chest pain.





- Differential Diagnosis


URI/Covid/ACS


Critical care attestation.: 


If time is entered above; I have spent that time in minutes in the direct care 

of this critically ill patient, excluding procedure time.








ED Disposition


Clinical Impression: 


URI (upper respiratory infection)


Qualifiers:


 URI type: unspecified URI Qualified Code(s): J06.9 - Acute upper respiratory 

infection, unspecified





Disposition: 01 HOME / SELF CARE / HOMELESS


Is pt being admited?: No


Does the pt Need Aspirin: No


Condition: Stable


Instructions:  Upper Respiratory Infection, Adult, Easy-to-Read


Additional Instructions: 


Continue your home medications





Meds as ordered today 





stay well-hydrated with water 





Motrin or Tylenol for pain or fever





Follow-up with primary care in 48 hours to make sure you are getting better














Prescriptions: 


Fluticasone [Flonase] 1 spray NS QDAY #1 bottle


methylPREDNISolone [Medrol 4MG DOSEPAK (21 tabs)] 4 mg PO FS #1 tab.ds.pk


Benzonatate [Tessalon Perles] 100 mg PO Q12H PRN #20 capsule


 PRN Reason: Cough


Azithromycin [Zithromax Z-IRENA] 250 mg PO DAILY #6 tablet


Referrals: 


YUE GUARDADO MD [Primary Care Provider] - 3-5 Days


Time of Disposition: 12:35

## 2022-04-06 NOTE — XRAY REPORT
CHEST 2 VIEWS 



INDICATION: 

sob- soft tissue/mass above right clav..



COMPARISON: 

11/4/2020



FINDINGS:



SUPPORT DEVICES: None.



HEART: Within normal limits. 



LUNGS/PLEURA: No acute air space or interstitial disease.  No pneumothorax.



ADDITIONAL FINDINGS: None.







IMPRESSION:

1. No acute findings. 

2. No gross soft tissue mass visualized over the right clavicle but if there is high clinical concern
 consider follow-up ultrasound or CT.



Signer Name: Tony Kevin MD 

Signed: 4/6/2022 11:12 AM

Workstation Name: [id number]

## 2022-04-07 NOTE — ELECTROCARDIOGRAPH REPORT
[facility]

                                       

Test Date:    2022               Test Time:    09:44:15

Pat Name:     GOSIA JOHNSON        Department:   

Patient ID:   [id number]          Room:          

Gender:       F                        Technician:   CHARISSA

:          [id number]               Requested By: CLEMENTE DURÁN

Order Number: [id number]              Reading MD:   Truman Izquierdo

                                 Measurements

Intervals                              Axis          

Rate:         90                       P:            52

WA:           149                      QRS:          -20

QRSD:         74                       T:            37

QT:           365                                    

QTc:          448                                    

                           Interpretive Statements

Sinus rhythm

Probable left atrial enlargement

Low voltage, precordial leads

Consider anterior infarct

No previous ECG available for comparison

Electronically Signed On 2022 10:27:13 EDT by Truman Izquierdo